# Patient Record
Sex: FEMALE | Race: WHITE | Employment: STUDENT | URBAN - METROPOLITAN AREA
[De-identification: names, ages, dates, MRNs, and addresses within clinical notes are randomized per-mention and may not be internally consistent; named-entity substitution may affect disease eponyms.]

---
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
## 2023-01-02 ENCOUNTER — E-VISIT (OUTPATIENT)
Dept: PRIMARY CARE CLINIC | Age: 18
End: 2023-01-02
Payer: COMMERCIAL

## 2023-01-02 DIAGNOSIS — N76.0 ACUTE VAGINITIS: Primary | ICD-10-CM

## 2023-01-02 PROCEDURE — 99422 OL DIG E/M SVC 11-20 MIN: CPT | Performed by: NURSE PRACTITIONER

## 2023-01-02 RX ORDER — FLUCONAZOLE 150 MG/1
150 TABLET ORAL ONCE
Qty: 1 TABLET | Refills: 1 | Status: SHIPPED | OUTPATIENT
Start: 2023-01-02 | End: 2023-01-02

## 2023-01-02 NOTE — PROGRESS NOTES
Abdulaziz Aguilar (2005) initiated an asynchronous digital communication through [employer]. HPI: per patient questionnaire     Exam: not applicable    Diagnoses and all orders for this visit:  Diagnoses and all orders for this visit:    Acute vaginitis    Other orders  -     fluconazole (DIFLUCAN) 150 MG tablet; Take 1 tablet by mouth once for 1 dose May repeat in 3 days if needed  Supportive care measures provided  Recommend f/u with pcp or gyn if needed     Time: EV2 - 11-20 minutes were spent on the digital evaluation and management of this patient.  14 min     Gerhardt Plowman, APRN - CNP

## 2023-01-09 ENCOUNTER — TELEPHONE (OUTPATIENT)
Dept: FAMILY MEDICINE CLINIC | Age: 18
End: 2023-01-09

## 2023-01-09 NOTE — TELEPHONE ENCOUNTER
----- Message from Johnnie Fox sent at 1/9/2023  1:57 PM EST -----  Regarding: Flu   Hello I have the flu and I am unable to go to work due to frequent vomiting and diarrhea. This started yesterday morning 01/08/2023. I feel a little better today but am unsure when Ill be able to return to work. Can you please write a doctors note stating I have the flu? Thank you.

## 2023-02-27 ENCOUNTER — OFFICE VISIT (OUTPATIENT)
Dept: FAMILY MEDICINE CLINIC | Age: 18
End: 2023-02-27
Payer: COMMERCIAL

## 2023-02-27 VITALS
SYSTOLIC BLOOD PRESSURE: 102 MMHG | DIASTOLIC BLOOD PRESSURE: 68 MMHG | HEART RATE: 91 BPM | WEIGHT: 184 LBS | BODY MASS INDEX: 30.66 KG/M2 | OXYGEN SATURATION: 94 % | TEMPERATURE: 97.6 F | HEIGHT: 65 IN

## 2023-02-27 DIAGNOSIS — J30.9 ALLERGIC RHINITIS, UNSPECIFIED SEASONALITY, UNSPECIFIED TRIGGER: ICD-10-CM

## 2023-02-27 DIAGNOSIS — R06.83 SNORING: ICD-10-CM

## 2023-02-27 DIAGNOSIS — J01.20 ACUTE NON-RECURRENT ETHMOIDAL SINUSITIS: ICD-10-CM

## 2023-02-27 DIAGNOSIS — G47.9 SLEEP DISTURBANCE: ICD-10-CM

## 2023-02-27 DIAGNOSIS — F41.9 ANXIETY: Primary | ICD-10-CM

## 2023-02-27 PROCEDURE — 99214 OFFICE O/P EST MOD 30 MIN: CPT | Performed by: FAMILY MEDICINE

## 2023-02-27 PROCEDURE — 1036F TOBACCO NON-USER: CPT | Performed by: FAMILY MEDICINE

## 2023-02-27 PROCEDURE — G8484 FLU IMMUNIZE NO ADMIN: HCPCS | Performed by: FAMILY MEDICINE

## 2023-02-27 PROCEDURE — G8419 CALC BMI OUT NRM PARAM NOF/U: HCPCS | Performed by: FAMILY MEDICINE

## 2023-02-27 PROCEDURE — G8427 DOCREV CUR MEDS BY ELIG CLIN: HCPCS | Performed by: FAMILY MEDICINE

## 2023-02-27 SDOH — ECONOMIC STABILITY: HOUSING INSECURITY
IN THE LAST 12 MONTHS, WAS THERE A TIME WHEN YOU DID NOT HAVE A STEADY PLACE TO SLEEP OR SLEPT IN A SHELTER (INCLUDING NOW)?: NO

## 2023-02-27 SDOH — ECONOMIC STABILITY: FOOD INSECURITY: WITHIN THE PAST 12 MONTHS, THE FOOD YOU BOUGHT JUST DIDN'T LAST AND YOU DIDN'T HAVE MONEY TO GET MORE.: NEVER TRUE

## 2023-02-27 SDOH — ECONOMIC STABILITY: FOOD INSECURITY: WITHIN THE PAST 12 MONTHS, YOU WORRIED THAT YOUR FOOD WOULD RUN OUT BEFORE YOU GOT MONEY TO BUY MORE.: NEVER TRUE

## 2023-02-27 SDOH — ECONOMIC STABILITY: INCOME INSECURITY: HOW HARD IS IT FOR YOU TO PAY FOR THE VERY BASICS LIKE FOOD, HOUSING, MEDICAL CARE, AND HEATING?: NOT HARD AT ALL

## 2023-02-27 ASSESSMENT — PATIENT HEALTH QUESTIONNAIRE - PHQ9
8. MOVING OR SPEAKING SO SLOWLY THAT OTHER PEOPLE COULD HAVE NOTICED. OR THE OPPOSITE, BEING SO FIGETY OR RESTLESS THAT YOU HAVE BEEN MOVING AROUND A LOT MORE THAN USUAL: 1
7. TROUBLE CONCENTRATING ON THINGS, SUCH AS READING THE NEWSPAPER OR WATCHING TELEVISION: 1
6. FEELING BAD ABOUT YOURSELF - OR THAT YOU ARE A FAILURE OR HAVE LET YOURSELF OR YOUR FAMILY DOWN: 1
9. THOUGHTS THAT YOU WOULD BE BETTER OFF DEAD, OR OF HURTING YOURSELF: 0
2. FEELING DOWN, DEPRESSED OR HOPELESS: 1
SUM OF ALL RESPONSES TO PHQ QUESTIONS 1-9: 8
3. TROUBLE FALLING OR STAYING ASLEEP: 1
4. FEELING TIRED OR HAVING LITTLE ENERGY: 1
5. POOR APPETITE OR OVEREATING: 1
SUM OF ALL RESPONSES TO PHQ QUESTIONS 1-9: 8
10. IF YOU CHECKED OFF ANY PROBLEMS, HOW DIFFICULT HAVE THESE PROBLEMS MADE IT FOR YOU TO DO YOUR WORK, TAKE CARE OF THINGS AT HOME, OR GET ALONG WITH OTHER PEOPLE: 0
SUM OF ALL RESPONSES TO PHQ9 QUESTIONS 1 & 2: 2
1. LITTLE INTEREST OR PLEASURE IN DOING THINGS: 1
SUM OF ALL RESPONSES TO PHQ QUESTIONS 1-9: 8
SUM OF ALL RESPONSES TO PHQ QUESTIONS 1-9: 8

## 2023-02-27 ASSESSMENT — COLUMBIA-SUICIDE SEVERITY RATING SCALE - C-SSRS
1. WITHIN THE PAST MONTH, HAVE YOU WISHED YOU WERE DEAD OR WISHED YOU COULD GO TO SLEEP AND NOT WAKE UP?: NO
6. HAVE YOU EVER DONE ANYTHING, STARTED TO DO ANYTHING, OR PREPARED TO DO ANYTHING TO END YOUR LIFE?: NO
2. HAVE YOU ACTUALLY HAD ANY THOUGHTS OF KILLING YOURSELF?: NO

## 2023-02-27 NOTE — PROGRESS NOTES
Chief Complaint   Patient presents with    Discuss Medications     Discuss prozac & see if it effects sleeps    Sinus Problem     Pt has reoccurring sinus infections & wants to discuss. HPI: Noah Zhou 19 y.o. female presenting for      having sinus issues   Reports that her allergies are very bad   reports that she is doing well today but there are times where she has congestion and postnasal drip   Reports that she feels more sick frequently and reports that it is affecting her more. Patient reports that she can hear wheezing in the lungs that make it worse. Has tried Claritin, benadryl, does not help   allegra sometimes helps, zyrtec and flonase helps a little bit. F/u   Patient reprots that for a while she has allergies. Symptoms have worsened   Also noticed she gets a lot of sinus infections   Was seen by ENT who suggested she sees an immunologist.   Denies any current sinus infection. Insomnia   Patient reports issues with sleep   Reports that she is having stressors   Patient is sleeping more but is unable to fall asleep   Admits to tossing and turning throughout the night. Has not had a sleep study before. Patient is coming here with a chief complaint of depression. Patient reports that she was referred by her pediatrician to establish care with a family practice physician. Patient sees a counselor and has been establishing care for the last 6 years. Patient has a diagnosis of depression and anxiety. Patient reports that she has a difficult time showering, getting out of bed, changes in her appetite, suicidal ideations (last suicidal ideation was several weeks ago). Denies any plans. Patient has never been admitted to the psychiatric unit. Patient has history of self cutting with last 1 last year. Patient admits now that she tends to punch walls and hits her thighs bilaterally to relieve stress. Patient used to cut on her thighs and on her left arm.   Patient admits to having good social support system with her boyfriend as well as her father. Patient denies any fevers, chills, nausea, vomiting, chest pain, shortness of breath, abdominal pain, change in nation, change in stools. Patient is currently taking high school courses in college online courses at [employer]. Patient has plans to become a medical doctor. Patient reports that balancing her school courses in addition to handling her depression anxiety has been difficult and has affected her grades. Patient reports that she is doing better in school now. She denies any current homicidal or suicidal ideations. Follow-up 9/14/2020  Patient is following up for her depression. Patient reports after taking Lexapro for the last week and a half she noticed some side effects. Patient states frequency and suicidal ideations. Denies any intent or plan. Patient denies any homicidal ideations. Patient denies auditory or visual hallucinations. Patient still admits to a good social support system with her counselor and her boyfriend. After having  increased suicidal ideation, patient stopped taking the medication several days ago. Denies fever, chills, nausea, vomiting, chest pain, shortness of breath, abdominal pain, change urination, change in stools. Follow-up 10/23/2020  Patient reports that she had some side effects from the Zoloft medication. As you may recall, patient was on 12.5 mg of the Zoloft. Patient ports that she felt like the medication was working however she did have some sedative effects with taking the medicine. Patient tried to take the medication at night however found herself sleeping past her alarm clock and only finding the medication wearing off around 12 PM.  Patient took the medication around 10 p.m. Patient denies any suicidal homicidal ideations. Patient denies any fevers, chills, nausea, vomiting, chest pain, shortness of breath, abdominal pain, change urination, change in stools.   Patient reports that she still sees her counselor. f/u 12/3/2020  Patient reports that the medication seems to have great effects within the first week but then started to notice it wearing off. Patient admitted to having side effects of having low moods that were fluctuated but not as much she was on the other medication. Patient denies any suicidal homicidal ideations currently. Denies any auditory visual hallucinations. Patient still seeing her counselor. F/u   Seeing counseling. In the last couple increased panic attacks (shortness of breath, heart racing)  Feels like friends are a trigger  Waiting to try prozac middle of April.  - will schdule an appoitemtn 1 month after that. Current Outpatient Medications   Medication Sig Dispense Refill    albuterol sulfate HFA (PROAIR HFA) 108 (90 Base) MCG/ACT inhaler Use 1-2 puffs, every 4 hours while awake for PRN wheezing. May sub Ventolin or Proventil as needed per [employer]. 18 g 2    fluticasone (FLONASE) 50 MCG/ACT nasal spray 2 sprays by Each Nostril route daily 16 g 0    EPINEPHrine (EPIPEN) 0.3 MG/0.3ML SOAJ injection Inject 0.3 mg into the muscle      FLUoxetine (PROZAC) 10 MG capsule Take 1 capsule by mouth daily (Patient not taking: Reported on 2/27/2023) 30 capsule 3     No current facility-administered medications for this visit. ROS  CONSTITUTIONAL: The patient denies fevers, chills, sweats and body ache. HEENT: Denies headache, blurry vision, eye pain, tinnitus, vertigo,  sore throat, neck or thyroid masses. Admits to postauricular ear pain. RESPIRATORY: Denies cough, sputum, hemoptysis. CARDIAC: Denies chest pain, pressure, palpitations, Denies lower extremity edema. GASTROINTESTINAL: Denies abdominal pain, constipation, diarrhea, bleeding in the stools,   GENITOURINARY: Denies dysuria, hematuria, nocturia or frequency, urinary incontinence.   NEUROLOGIC: Denies headaches, dizziness, syncope, weakness  MUSCULOSKELETAL: denies changes in range of motion, joint pain, stiffness. ENDOCRINOLOGY: Denies heat or cold intolerance. HEMATOLOGY: Denies easy bleeding or blood transfusion,anemia  DERMATOLOGY: Denies changes in moles or pigmentation changes. PSYCHIATRY: Admits to depression and anxiety. Admits to insomnia     Past Medical History:   Diagnosis Date    Anxiety     Depression         No past surgical history on file. Family History   Problem Relation Age of Onset    Diabetes Mother     Depression Mother     Diabetes Father     Heart Disease Father     High Blood Pressure Father     High Cholesterol Father         Social History     Socioeconomic History    Marital status: Single     Spouse name: Not on file    Number of children: Not on file    Years of education: Not on file    Highest education level: Not on file   Occupational History    Not on file   Tobacco Use    Smoking status: Never    Smokeless tobacco: Never   Vaping Use    Vaping Use: Never used   Substance and Sexual Activity    Alcohol use: Yes     Comment: ocaasionally    Drug use: Yes     Types: Marijuana (Weed)     Comment: occasionally    Sexual activity: Yes     Birth control/protection: I.U.D. Other Topics Concern    Not on file   Social History Narrative    Patient lives with her father. Patient does not have a relationship with her mother (has a history of depression and substance use). Patient has plans to become a medical doctor and is currently taking high school courses and college courses at [employer]. The [employer] program     Social Determinants of Health     Financial Resource Strain: Low Risk     Difficulty of Paying Living Expenses: Not hard at all   Food Insecurity: No Food Insecurity    Worried About [address] in the Last Year: Never true    [address] in the Last Year: Never true   Transportation Needs: Unknown    Lack of Transportation (Medical): Not on file    Lack of Transportation (Non-Medical):  No   Physical Activity: Not on file   Stress: Not on file   Social Connections: Not on file   Intimate Partner Violence: Not on file   Housing Stability: Unknown    Unable to Pay for Housing in the Last Year: Not on file    Number of [place] in the Last Year: Not on file    Unstable Housing in the Last Year: No        /68   Pulse 91   Temp 97.6 °F (36.4 °C) (Temporal)   Ht 5' 5\" (1.651 m)   Wt 184 lb (83.5 kg)   SpO2 94%   BMI 30.62 kg/m²        Physical Exam:    General appearance - alert, well appearing, and in no distress  Mental Status - alert, oriented to person, place, and time  Eyes - pupils equal and reactive, extraocular eye movements intact   Ears - bilateral TM's and external ear canals normal. Small amount of swelling behind the ear. No redness or discharge.    Nose - normal and patent, no erythema, discharge or polyps   Sinuses - Normal paranasal sinuses without tenderness   Throat - mucous membranes moist, pharynx normal without lesions   Neck - supple, no significant adenopathy   Thyroid - thyroid is normal in size without nodules or tenderness    Chest - clear to auscultation, no wheezes, rales or rhonchi, symmetric air entry   Heart - normal rate, regular rhythm, normal S1, S2, no murmurs, rubs, clicks or gallops  Abdomen - soft, nontender, nondistended, no masses or organomegaly   Back exam - full range of motion, no tenderness, palpable spasm or pain on motion   Neurological - alert, oriented, normal speech, no focal findings or movement disorder noted   Musculoskeletal - no joint tenderness, deformity or swelling   Extremities - peripheral pulses normal, no pedal edema, no clubbing or cyanosis   Skin - normal coloration and turgor, no rashes, no suspicious skin lesions noted      Labs   No results found for: TSHREFLEX  No results found for: TSH    No results found for: NA, K, CL, CO2, BUN, CREATININE, GLUCOSE, CALCIUM, PROT, LABALBU, BILITOT, ALKPHOS, AST, ALT, LABGLOM, GFRAA, AGRATIO, GLOB      No results found for: WBC, HGB, HCT, MCV, PLT    Reviewed notes from pediatrics    A/P: Margo Layton 19 y.o. female presenting for     1. Anxiety  Patient wants to start Prozac when she begins spring break. Continue seeing her counselor. Denies any suicidal homicidal ideations. - CBC with Auto Differential; Future  - Comprehensive Metabolic Panel; Future    2. Allergic rhinitis, unspecified seasonality, unspecified trigger    - CBC with Auto Differential; Future  - Comprehensive Metabolic Panel; Future    3. Acute non-recurrent ethmoidal sinusitis  Recurring allergies. Patient will see a allergist.    4. Sleep disturbance  We will see if it is covered by her  - Baseline Diagnostic Sleep Study; Future    5. Snoring    - Baseline Diagnostic Sleep Study; Future          Please note, this report has been partially produced using speech recognition software  and may cause  and /or contain errors related to that system including grammar, punctuation and spelling as well as words and phrases that may seem inappropriate. If there are questions or concerns please feel free to contact me to clarify.

## 2023-03-01 ENCOUNTER — E-VISIT (OUTPATIENT)
Dept: PRIMARY CARE CLINIC | Age: 18
End: 2023-03-01
Payer: COMMERCIAL

## 2023-03-01 DIAGNOSIS — R30.0 DYSURIA: Primary | ICD-10-CM

## 2023-03-01 PROCEDURE — 99422 OL DIG E/M SVC 11-20 MIN: CPT | Performed by: NURSE PRACTITIONER

## 2023-03-01 RX ORDER — CEPHALEXIN 500 MG/1
500 CAPSULE ORAL 2 TIMES DAILY
Qty: 14 CAPSULE | Refills: 0 | Status: SHIPPED | OUTPATIENT
Start: 2023-03-01 | End: 2023-03-08

## 2023-03-26 ENCOUNTER — OFFICE VISIT (OUTPATIENT)
Dept: FAMILY MEDICINE CLINIC | Age: 18
End: 2023-03-26
Payer: COMMERCIAL

## 2023-03-26 VITALS
RESPIRATION RATE: 15 BRPM | HEART RATE: 105 BPM | DIASTOLIC BLOOD PRESSURE: 72 MMHG | BODY MASS INDEX: 30.66 KG/M2 | TEMPERATURE: 97.3 F | OXYGEN SATURATION: 96 % | SYSTOLIC BLOOD PRESSURE: 106 MMHG | HEIGHT: 65 IN | WEIGHT: 184 LBS

## 2023-03-26 DIAGNOSIS — B37.9 ANTIBIOTIC-INDUCED YEAST INFECTION: ICD-10-CM

## 2023-03-26 DIAGNOSIS — J45.21 MILD INTERMITTENT REACTIVE AIRWAY DISEASE WITH ACUTE EXACERBATION: Primary | ICD-10-CM

## 2023-03-26 DIAGNOSIS — T36.95XA ANTIBIOTIC-INDUCED YEAST INFECTION: ICD-10-CM

## 2023-03-26 LAB
INFLUENZA A ANTIBODY: NEGATIVE
INFLUENZA B ANTIBODY: NEGATIVE
Lab: NORMAL
PERFORMING INSTRUMENT: NORMAL
QC PASS/FAIL: NORMAL
RSV ANTIGEN: NEGATIVE
SARS-COV-2, POC: NORMAL

## 2023-03-26 PROCEDURE — 87426 SARSCOV CORONAVIRUS AG IA: CPT | Performed by: NURSE PRACTITIONER

## 2023-03-26 PROCEDURE — G8427 DOCREV CUR MEDS BY ELIG CLIN: HCPCS | Performed by: NURSE PRACTITIONER

## 2023-03-26 PROCEDURE — 86756 RESPIRATORY VIRUS ANTIBODY: CPT | Performed by: NURSE PRACTITIONER

## 2023-03-26 PROCEDURE — 99213 OFFICE O/P EST LOW 20 MIN: CPT | Performed by: NURSE PRACTITIONER

## 2023-03-26 PROCEDURE — G8417 CALC BMI ABV UP PARAM F/U: HCPCS | Performed by: NURSE PRACTITIONER

## 2023-03-26 PROCEDURE — 1036F TOBACCO NON-USER: CPT | Performed by: NURSE PRACTITIONER

## 2023-03-26 PROCEDURE — 87804 INFLUENZA ASSAY W/OPTIC: CPT | Performed by: NURSE PRACTITIONER

## 2023-03-26 PROCEDURE — G8484 FLU IMMUNIZE NO ADMIN: HCPCS | Performed by: NURSE PRACTITIONER

## 2023-03-26 RX ORDER — AMOXICILLIN AND CLAVULANATE POTASSIUM 875; 125 MG/1; MG/1
1 TABLET, FILM COATED ORAL 2 TIMES DAILY
Qty: 20 TABLET | Refills: 0 | Status: SHIPPED | OUTPATIENT
Start: 2023-03-26 | End: 2023-04-05

## 2023-03-26 RX ORDER — PREDNISONE 50 MG/1
50 TABLET ORAL DAILY
Qty: 5 TABLET | Refills: 0 | Status: SHIPPED | OUTPATIENT
Start: 2023-03-26 | End: 2023-03-31

## 2023-03-26 RX ORDER — FLUCONAZOLE 150 MG/1
150 TABLET ORAL
Qty: 3 TABLET | Refills: 0 | Status: SHIPPED | OUTPATIENT
Start: 2023-03-26 | End: 2023-04-02

## 2023-03-26 ASSESSMENT — ENCOUNTER SYMPTOMS
ABDOMINAL PAIN: 0
EYE DISCHARGE: 0
SINUS PAIN: 1
CHEST TIGHTNESS: 1
SHORTNESS OF BREATH: 1
VOMITING: 0
EYE PAIN: 0
CONSTIPATION: 0
WHEEZING: 1
COUGH: 1
RHINORRHEA: 1
PHOTOPHOBIA: 0
EYE REDNESS: 0
SORE THROAT: 0
EYE ITCHING: 0
NAUSEA: 0
SINUS PRESSURE: 1
DIARRHEA: 0
TROUBLE SWALLOWING: 0

## 2023-03-26 ASSESSMENT — VISUAL ACUITY: OU: 1

## 2023-03-26 NOTE — PROGRESS NOTES
POCT Influenza A/B  -     POCT COVID-19, Antigen  -     POCT RSV  -     amoxicillin-clavulanate (AUGMENTIN) 875-125 MG per tablet; Take 1 tablet by mouth 2 times daily for 10 days  -     predniSONE (DELTASONE) 50 MG tablet; Take 1 tablet by mouth daily for 5 days    Antibiotic-induced yeast infection  -     fluconazole (DIFLUCAN) 150 MG tablet; Take 1 tablet by mouth every 72 hours for 3 doses    Side effects, adverse effects of the medication prescribed today, as well as treatment plan/ rationale and result expectations have been discussed with the patient who expresses understanding and desires to proceed. Close follow up to evaluate treatment results and for coordination of care. I have reviewed the patient's medical history in detail and updated the computerized patient record. As always, patient is advised that if symptoms worsen in any way they will proceed to the nearest emergency room.      Follow up in 48-72 hours if symptoms persist or worsen and as needed    Rob Dent, JAYDEN - CNP

## 2023-05-02 ENCOUNTER — OFFICE VISIT (OUTPATIENT)
Dept: FAMILY MEDICINE CLINIC | Age: 18
End: 2023-05-02
Payer: COMMERCIAL

## 2023-05-02 VITALS
WEIGHT: 187 LBS | TEMPERATURE: 97.4 F | DIASTOLIC BLOOD PRESSURE: 78 MMHG | BODY MASS INDEX: 31.16 KG/M2 | HEIGHT: 65 IN | HEART RATE: 72 BPM | SYSTOLIC BLOOD PRESSURE: 128 MMHG | OXYGEN SATURATION: 98 %

## 2023-05-02 DIAGNOSIS — K21.9 GASTROESOPHAGEAL REFLUX DISEASE WITHOUT ESOPHAGITIS: ICD-10-CM

## 2023-05-02 DIAGNOSIS — F41.9 ANXIETY: ICD-10-CM

## 2023-05-02 DIAGNOSIS — R42 VERTIGO: Primary | ICD-10-CM

## 2023-05-02 PROCEDURE — G8427 DOCREV CUR MEDS BY ELIG CLIN: HCPCS | Performed by: FAMILY MEDICINE

## 2023-05-02 PROCEDURE — 99214 OFFICE O/P EST MOD 30 MIN: CPT | Performed by: FAMILY MEDICINE

## 2023-05-02 PROCEDURE — 1036F TOBACCO NON-USER: CPT | Performed by: FAMILY MEDICINE

## 2023-05-02 PROCEDURE — G8417 CALC BMI ABV UP PARAM F/U: HCPCS | Performed by: FAMILY MEDICINE

## 2023-05-02 RX ORDER — FLUOXETINE 10 MG/1
10 CAPSULE ORAL DAILY
Qty: 30 CAPSULE | Refills: 1 | Status: SHIPPED | OUTPATIENT
Start: 2023-05-02

## 2023-05-02 RX ORDER — MECLIZINE HYDROCHLORIDE 25 MG/1
25 TABLET ORAL 3 TIMES DAILY PRN
Qty: 30 TABLET | Refills: 0 | Status: SHIPPED | OUTPATIENT
Start: 2023-05-02 | End: 2023-05-12

## 2023-05-02 NOTE — PROGRESS NOTES
Chief Complaint   Patient presents with    Medication Refill     Discuss prozac. Dizziness     Pt reports she is having nausea, dizziness, and stomach issues. HPI: Noah Zhou 19 y.o. female presenting for      having sinus issues   Reports that her allergies are very bad   reports that she is doing well today but there are times where she has congestion and postnasal drip   Reports that she feels more sick frequently and reports that it is affecting her more. Patient reports that she can hear wheezing in the lungs that make it worse. Has tried Claritin, benadryl, does not help   allegra sometimes helps, zyrtec and flonase helps a little bit. F/u   Patient reprots that for a while she has allergies. Symptoms have worsened   Also noticed she gets a lot of sinus infections   Was seen by ENT who suggested she sees an immunologist.   Denies any current sinus infection. Insomnia   Patient reports issues with sleep   Reports that she is having stressors   Patient is sleeping more but is unable to fall asleep   Admits to tossing and turning throughout the night. Has not had a sleep study before. Patient is coming here with a chief complaint of depression. Patient reports that she was referred by her pediatrician to establish care with a family practice physician. Patient sees a counselor and has been establishing care for the last 6 years. Patient has a diagnosis of depression and anxiety. Patient reports that she has a difficult time showering, getting out of bed, changes in her appetite, suicidal ideations (last suicidal ideation was several weeks ago). Denies any plans. Patient has never been admitted to the psychiatric unit. Patient has history of self cutting with last 1 last year. Patient admits now that she tends to punch walls and hits her thighs bilaterally to relieve stress. Patient used to cut on her thighs and on her left arm.   Patient admits to having good social

## 2023-11-06 ENCOUNTER — TELEPHONE (OUTPATIENT)
Dept: FAMILY MEDICINE CLINIC | Age: 18
End: 2023-11-06

## 2023-11-06 NOTE — TELEPHONE ENCOUNTER
Called patient per message below, left VM to call office to schedule.                 Appointment Request From: Minna Khan     With Provider: MD GARETT Diaz AT [facility]]     Preferred Date Range: 12/7/2023 - 12/14/2023     Preferred Times: Any Time     Reason for visit: Office Visit     Comments:  Weight loss, general health check up, mental health progress

## 2023-12-27 ENCOUNTER — E-VISIT (OUTPATIENT)
Dept: PRIMARY CARE CLINIC | Age: 18
End: 2023-12-27
Payer: COMMERCIAL

## 2023-12-27 DIAGNOSIS — J06.9 UPPER RESPIRATORY TRACT INFECTION, UNSPECIFIED TYPE: Primary | ICD-10-CM

## 2023-12-27 PROCEDURE — 99423 OL DIG E/M SVC 21+ MIN: CPT | Performed by: NURSE PRACTITIONER

## 2023-12-28 RX ORDER — METHYLPREDNISOLONE 4 MG/1
TABLET ORAL
Qty: 1 KIT | Refills: 0 | Status: SHIPPED | OUTPATIENT
Start: 2023-12-28 | End: 2023-12-29

## 2023-12-28 ASSESSMENT — LIFESTYLE VARIABLES
SMOKING_YEARS: 2
SMOKING_STATUS: YES

## 2023-12-28 NOTE — PROGRESS NOTES
Karen Abbott (2005) initiated an asynchronous digital communication through [employer]. HPI: per patient questionnaire     Exam: not applicable    Diagnoses and all orders for this visit:  Diagnoses and all orders for this visit:    Upper respiratory tract infection, unspecified type    Other orders  -     methylPREDNISolone (MEDROL, JACKSON,) 4 MG tablet; Take as directed    Sx started 3 days ago. Like allergy or viral. Steroids sent. Supportive care suggestions provided. F/u with pcp as needed    Time: EV3 - 21 or more minutes were spent on the digital evaluation and management of this patient. 23 min     Dorothy Arceo, APRN - CNP

## 2023-12-29 ENCOUNTER — E-VISIT (OUTPATIENT)
Dept: FAMILY MEDICINE CLINIC | Age: 18
End: 2023-12-29
Payer: COMMERCIAL

## 2023-12-29 DIAGNOSIS — B96.89 ACUTE BACTERIAL SINUSITIS: Primary | ICD-10-CM

## 2023-12-29 DIAGNOSIS — J01.90 ACUTE BACTERIAL SINUSITIS: Primary | ICD-10-CM

## 2023-12-29 DIAGNOSIS — U07.1 COVID-19: ICD-10-CM

## 2023-12-29 DIAGNOSIS — U07.1 COVID-19: Primary | ICD-10-CM

## 2023-12-29 PROCEDURE — 99421 OL DIG E/M SVC 5-10 MIN: CPT | Performed by: FAMILY MEDICINE

## 2023-12-29 RX ORDER — AMOXICILLIN AND CLAVULANATE POTASSIUM 875; 125 MG/1; MG/1
1 TABLET, FILM COATED ORAL 2 TIMES DAILY
Qty: 20 TABLET | Refills: 0 | Status: SHIPPED | OUTPATIENT
Start: 2023-12-29 | End: 2023-12-29

## 2023-12-29 RX ORDER — METHYLPREDNISOLONE 4 MG/1
TABLET ORAL
Qty: 1 KIT | Refills: 0 | Status: SHIPPED | OUTPATIENT
Start: 2023-12-29 | End: 2024-01-04

## 2023-12-29 ASSESSMENT — LIFESTYLE VARIABLES
SMOKING_STATUS: YES
SMOKING_YEARS: 2

## 2023-12-29 NOTE — PROGRESS NOTES
Patient is complaining of sinus infection. Going on for 4 days. Associated with thick nasal discarge and fevers. Patient is also complaining of coughing. In the past patient's symptoms resolved with abx and steroids.        Sinusitis   Spent 5 minutes on encounter   Treat with augmentin x 10 days and steroids   Recommend covid test

## 2024-01-04 ENCOUNTER — TELEPHONE (OUTPATIENT)
Dept: FAMILY MEDICINE CLINIC | Age: 19
End: 2024-01-04

## 2024-01-04 NOTE — TELEPHONE ENCOUNTER
----- Message from Janeth Dani sent at 1/4/2024 11:07 AM EST -----  Subject: Message to Provider    QUESTIONS  Information for Provider? Patient called to set appt. Please call patient   to appoint as per her message. Thank you  ---------------------------------------------------------------------------  --------------  CALL BACK INFO  [phone]; OK to leave message on voicemail  ---------------------------------------------------------------------------  --------------  SCRIPT ANSWERS  Relationship to Patient? Self

## 2024-01-10 ENCOUNTER — OFFICE VISIT (OUTPATIENT)
Dept: FAMILY MEDICINE CLINIC | Age: 19
End: 2024-01-10
Payer: COMMERCIAL

## 2024-01-10 VITALS
WEIGHT: 214 LBS | DIASTOLIC BLOOD PRESSURE: 70 MMHG | HEART RATE: 112 BPM | HEIGHT: 65 IN | BODY MASS INDEX: 35.65 KG/M2 | TEMPERATURE: 97.8 F | SYSTOLIC BLOOD PRESSURE: 104 MMHG | OXYGEN SATURATION: 98 %

## 2024-01-10 DIAGNOSIS — N63.23 MASS OF LOWER OUTER QUADRANT OF LEFT BREAST: Primary | ICD-10-CM

## 2024-01-10 DIAGNOSIS — J06.9 RECURRENT UPPER RESPIRATORY INFECTION (URI): ICD-10-CM

## 2024-01-10 PROCEDURE — 99213 OFFICE O/P EST LOW 20 MIN: CPT | Performed by: FAMILY MEDICINE

## 2024-01-10 ASSESSMENT — PATIENT HEALTH QUESTIONNAIRE - PHQ9
SUM OF ALL RESPONSES TO PHQ QUESTIONS 1-9: 2
SUM OF ALL RESPONSES TO PHQ QUESTIONS 1-9: 2
2. FEELING DOWN, DEPRESSED OR HOPELESS: 1
SUM OF ALL RESPONSES TO PHQ9 QUESTIONS 1 & 2: 2
2. FEELING DOWN, DEPRESSED OR HOPELESS: SEVERAL DAYS
SUM OF ALL RESPONSES TO PHQ9 QUESTIONS 1 & 2: 2
1. LITTLE INTEREST OR PLEASURE IN DOING THINGS: 1
SUM OF ALL RESPONSES TO PHQ QUESTIONS 1-9: 2
SUM OF ALL RESPONSES TO PHQ QUESTIONS 1-9: 2
1. LITTLE INTEREST OR PLEASURE IN DOING THINGS: SEVERAL DAYS

## 2024-01-10 NOTE — PROGRESS NOTES
Subjective  Marta Waddell 2005 is a 19 y.o. female who presents today with:  Chief Complaint   Patient presents with    Breast Mass     Marta reports that she believes that she found a lump in her left breast about a week ago. She does have some pain in the area with palpation. Denies discoloration of the skin or nipple discharge. Notes she does have a maternal cousin who has had breast cancer.     Chest Pain     She had COVID the week of 12/26. She was prescribed Paxlovid for her symptoms which she has taken as directed. Reports she had a lot of chest pain during the illness that has since resolved. She does vape.        Chest Pain       I have reviewed HPI and agree with above.  She has a very strong family history of many different types of cancer.  She has an aunt with breast cancer x 2.  The types of cancer range from melanoma, ovary, uterine, breast, lung and colon.    Review of Systems   Cardiovascular:  Positive for chest pain.   All other systems reviewed and are negative.      Past Medical History:   Diagnosis Date    Anxiety     COVID 12/29/2023    Depression      History reviewed. No pertinent surgical history.  Social History     Socioeconomic History    Marital status: Single     Spouse name: Not on file    Number of children: Not on file    Years of education: Not on file    Highest education level: Not on file   Occupational History    Not on file   Tobacco Use    Smoking status: Never    Smokeless tobacco: Never   Vaping Use    Vaping Use: Every day   Substance and Sexual Activity    Alcohol use: Yes     Comment: ocaasionally    Drug use: Yes     Types: Marijuana (Weed)     Comment: occasionally    Sexual activity: Yes     Partners: Male     Birth control/protection: I.U.D.   Other Topics Concern    Not on file   Social History Narrative    Patient lives with her father.  Patient does not have a relationship with her mother (has a history of depression and substance use).  Patient has

## 2024-01-23 ENCOUNTER — E-VISIT (OUTPATIENT)
Dept: FAMILY MEDICINE CLINIC | Age: 19
End: 2024-01-23
Payer: COMMERCIAL

## 2024-01-23 DIAGNOSIS — K21.9 GASTROESOPHAGEAL REFLUX DISEASE WITHOUT ESOPHAGITIS: Primary | ICD-10-CM

## 2024-01-23 PROCEDURE — 99421 OL DIG E/M SVC 5-10 MIN: CPT | Performed by: FAMILY MEDICINE

## 2024-01-23 RX ORDER — OMEPRAZOLE 40 MG/1
40 CAPSULE, DELAYED RELEASE ORAL
Qty: 30 CAPSULE | Refills: 5 | Status: SHIPPED | OUTPATIENT
Start: 2024-01-23

## 2024-01-23 NOTE — PROGRESS NOTES
Patient is complaining of heart burn for several months. Going on for 20 minutes to 1 hour.  Associated with belching. Triggers include, smoking, caffeine, lying down and stress.       1. Gastroesophageal reflux disease without esophagitis  Will treat with omeprazole 40 mg daily. Will send to pharmacy   Spent 5-10 minutes no encounter   - omeprazole (PRILOSEC) 40 MG delayed release capsule; Take 1 capsule by mouth every morning (before breakfast)  Dispense: 30 capsule; Refill: 5

## 2024-03-20 ENCOUNTER — OFFICE VISIT (OUTPATIENT)
Dept: FAMILY MEDICINE CLINIC | Age: 19
End: 2024-03-20
Payer: COMMERCIAL

## 2024-03-20 VITALS
BODY MASS INDEX: 35.32 KG/M2 | HEIGHT: 65 IN | WEIGHT: 212 LBS | DIASTOLIC BLOOD PRESSURE: 80 MMHG | SYSTOLIC BLOOD PRESSURE: 106 MMHG | HEART RATE: 129 BPM | TEMPERATURE: 100.3 F | OXYGEN SATURATION: 99 %

## 2024-03-20 DIAGNOSIS — J10.1 INFLUENZA A: Primary | ICD-10-CM

## 2024-03-20 DIAGNOSIS — R05.1 ACUTE COUGH: ICD-10-CM

## 2024-03-20 LAB
INFLUENZA A ANTIBODY: ABNORMAL
INFLUENZA B ANTIBODY: ABNORMAL
Lab: NORMAL
PERFORMING INSTRUMENT: NORMAL
QC PASS/FAIL: NORMAL
SARS-COV-2, POC: NORMAL

## 2024-03-20 PROCEDURE — 87804 INFLUENZA ASSAY W/OPTIC: CPT | Performed by: NURSE PRACTITIONER

## 2024-03-20 PROCEDURE — 87426 SARSCOV CORONAVIRUS AG IA: CPT | Performed by: NURSE PRACTITIONER

## 2024-03-20 PROCEDURE — 99213 OFFICE O/P EST LOW 20 MIN: CPT | Performed by: NURSE PRACTITIONER

## 2024-03-20 RX ORDER — DEXTROMETHORPHAN HYDROBROMIDE AND PROMETHAZINE HYDROCHLORIDE 15; 6.25 MG/5ML; MG/5ML
5 SYRUP ORAL 4 TIMES DAILY PRN
Qty: 140 EACH | Refills: 0 | Status: SHIPPED | OUTPATIENT
Start: 2024-03-20 | End: 2024-03-27

## 2024-03-20 RX ORDER — OSELTAMIVIR PHOSPHATE 75 MG/1
75 CAPSULE ORAL 2 TIMES DAILY
Qty: 10 CAPSULE | Refills: 0 | Status: SHIPPED | OUTPATIENT
Start: 2024-03-20 | End: 2024-03-25

## 2024-03-20 SDOH — ECONOMIC STABILITY: FOOD INSECURITY: WITHIN THE PAST 12 MONTHS, THE FOOD YOU BOUGHT JUST DIDN'T LAST AND YOU DIDN'T HAVE MONEY TO GET MORE.: NEVER TRUE

## 2024-03-20 SDOH — ECONOMIC STABILITY: FOOD INSECURITY: WITHIN THE PAST 12 MONTHS, YOU WORRIED THAT YOUR FOOD WOULD RUN OUT BEFORE YOU GOT MONEY TO BUY MORE.: NEVER TRUE

## 2024-03-20 SDOH — ECONOMIC STABILITY: INCOME INSECURITY: HOW HARD IS IT FOR YOU TO PAY FOR THE VERY BASICS LIKE FOOD, HOUSING, MEDICAL CARE, AND HEATING?: NOT HARD AT ALL

## 2024-03-20 ASSESSMENT — ENCOUNTER SYMPTOMS
SINUS PAIN: 1
NAUSEA: 1
VOMITING: 0
ABDOMINAL PAIN: 0
SORE THROAT: 1
WHEEZING: 0
DIARRHEA: 1
RHINORRHEA: 1
SINUS PRESSURE: 1
SHORTNESS OF BREATH: 0
CHEST TIGHTNESS: 1
COUGH: 1

## 2024-03-20 NOTE — PROGRESS NOTES
Subjective:      Patient ID: Marta Waddell is a 19 y.o. female who presents today for:  Chief Complaint   Patient presents with    URI     Pt states coughing, sinus issues, headache, cold and hot sweats, nausea, dizziness x 3 days       HPI  Patient is here with c/o cough, sinus pressure HA, feeling hot and cold, dizziness, and nausea for the last 3 days.   Says she has an inhaler but she is not sure if she has asthma.   Says she has had fever but she has not been able to check it.   Says she is taking Cold and sinus med for sx as well.   Past Medical History:   Diagnosis Date    Anxiety     COVID 12/29/2023    Depression      History reviewed. No pertinent surgical history.  Social History     Socioeconomic History    Marital status: Single     Spouse name: Not on file    Number of children: Not on file    Years of education: Not on file    Highest education level: Not on file   Occupational History    Not on file   Tobacco Use    Smoking status: Never    Smokeless tobacco: Never   Vaping Use    Vaping Use: Every day   Substance and Sexual Activity    Alcohol use: Yes     Comment: ocaasionally    Drug use: Yes     Types: Marijuana (Weed)     Comment: occasionally    Sexual activity: Yes     Partners: Male     Birth control/protection: I.U.D.   Other Topics Concern    Not on file   Social History Narrative    Patient lives with her father.  Patient does not have a relationship with her mother (has a history of depression and substance use).  Patient has plans to become a medical doctor and is currently taking high school courses and college courses at [employer].  The [employer] program     Social Determinants of Health     Financial Resource Strain: Low Risk  (3/20/2024)    Overall Financial Resource Strain (CARDIA)     Difficulty of Paying Living Expenses: Not hard at all   Food Insecurity: No Food Insecurity (3/20/2024)    Hunger Vital Sign     Worried About Running Out of Food in the Last Year: Never true     Ran Out of

## 2024-11-26 ENCOUNTER — OFFICE VISIT (OUTPATIENT)
Dept: FAMILY MEDICINE CLINIC | Age: 19
End: 2024-11-26
Payer: COMMERCIAL

## 2024-11-26 VITALS
TEMPERATURE: 97.4 F | WEIGHT: 212 LBS | OXYGEN SATURATION: 98 % | DIASTOLIC BLOOD PRESSURE: 72 MMHG | HEIGHT: 65 IN | BODY MASS INDEX: 35.32 KG/M2 | SYSTOLIC BLOOD PRESSURE: 108 MMHG | HEART RATE: 81 BPM

## 2024-11-26 DIAGNOSIS — J02.9 SORE THROAT: ICD-10-CM

## 2024-11-26 DIAGNOSIS — B96.89 ACUTE BACTERIAL SINUSITIS: Primary | ICD-10-CM

## 2024-11-26 DIAGNOSIS — J01.90 ACUTE BACTERIAL SINUSITIS: Primary | ICD-10-CM

## 2024-11-26 LAB — S PYO AG THROAT QL: NORMAL

## 2024-11-26 PROCEDURE — 87880 STREP A ASSAY W/OPTIC: CPT | Performed by: NURSE PRACTITIONER

## 2024-11-26 PROCEDURE — 99213 OFFICE O/P EST LOW 20 MIN: CPT | Performed by: NURSE PRACTITIONER

## 2024-11-26 RX ORDER — AMOXICILLIN 875 MG/1
875 TABLET, COATED ORAL 2 TIMES DAILY
Qty: 20 TABLET | Refills: 0 | Status: SHIPPED | OUTPATIENT
Start: 2024-11-26 | End: 2024-12-06

## 2024-11-26 ASSESSMENT — ENCOUNTER SYMPTOMS
FACIAL SWELLING: 0
WHEEZING: 0
EYE PAIN: 0
SINUS PRESSURE: 1
STRIDOR: 0
COUGH: 0
ABDOMINAL PAIN: 0
EYE REDNESS: 0
EYE ITCHING: 0
SINUS PAIN: 1
DIARRHEA: 0
EYE DISCHARGE: 0
NAUSEA: 0
SORE THROAT: 1
SHORTNESS OF BREATH: 0
RHINORRHEA: 1
CHEST TIGHTNESS: 0
VOMITING: 0

## 2024-11-26 NOTE — PROGRESS NOTES
sounds, S1 normal and S2 normal.   Pulmonary:      Effort: Pulmonary effort is normal.      Breath sounds: Normal breath sounds and air entry.   Musculoskeletal:         General: Normal range of motion.      Cervical back: Normal range of motion and neck supple.   Lymphadenopathy:      Cervical: No cervical adenopathy.   Skin:     General: Skin is warm and dry.      Capillary Refill: Capillary refill takes less than 2 seconds.   Neurological:      General: No focal deficit present.      Mental Status: She is alert and oriented to person, place, and time. Mental status is at baseline.   Psychiatric:         Attention and Perception: Attention and perception normal.         Mood and Affect: Mood and affect normal.         Speech: Speech normal.         Behavior: Behavior normal. Behavior is cooperative.         Thought Content: Thought content normal.         Cognition and Memory: Cognition and memory normal.         Judgment: Judgment normal.         Assessment:       Diagnosis Orders   1. Acute bacterial sinusitis  amoxicillin (AMOXIL) 875 MG tablet      2. Sore throat  POCT rapid strep A        No results found for this visit on 11/26/24.   Plan:     Assessment & Plan   Marta was seen today for cold symptoms.    Diagnoses and all orders for this visit:    Acute bacterial sinusitis  -     amoxicillin (AMOXIL) 875 MG tablet; Take 1 tablet by mouth 2 times daily for 10 days    Sore throat  -     POCT rapid strep A    Rapid strep testing is neg in office.    Will treat for bacterial sinus infection. Advised to take ANTB with food and increase fluids. Discussed administration and SE of the ANTB as well as using probiotic or eating yogurt while on the ANTB.  Advised may use OTC decongestant and nasal steroid to help with symptoms as needed. Discussed with patient should start to see improvement within 3-4 days and should f/u with worsening sx or with no improvement.     Antibiotic Instructions:   Complete the full

## 2025-03-24 ENCOUNTER — OFFICE VISIT (OUTPATIENT)
Age: 20
End: 2025-03-24
Payer: COMMERCIAL

## 2025-03-24 VITALS
HEART RATE: 96 BPM | OXYGEN SATURATION: 98 % | SYSTOLIC BLOOD PRESSURE: 126 MMHG | TEMPERATURE: 97.9 F | BODY MASS INDEX: 37.49 KG/M2 | DIASTOLIC BLOOD PRESSURE: 76 MMHG | HEIGHT: 65 IN | WEIGHT: 225 LBS

## 2025-03-24 DIAGNOSIS — F41.9 ANXIETY: ICD-10-CM

## 2025-03-24 DIAGNOSIS — E66.812 CLASS 2 OBESITY DUE TO EXCESS CALORIES WITH BODY MASS INDEX (BMI) OF 37.0 TO 37.9 IN ADULT, UNSPECIFIED WHETHER SERIOUS COMORBIDITY PRESENT: ICD-10-CM

## 2025-03-24 DIAGNOSIS — E66.09 CLASS 2 OBESITY DUE TO EXCESS CALORIES WITH BODY MASS INDEX (BMI) OF 37.0 TO 37.9 IN ADULT, UNSPECIFIED WHETHER SERIOUS COMORBIDITY PRESENT: ICD-10-CM

## 2025-03-24 DIAGNOSIS — R07.9 RIGHT-SIDED CHEST PAIN: ICD-10-CM

## 2025-03-24 DIAGNOSIS — J34.89 NASAL LESION: Primary | ICD-10-CM

## 2025-03-24 DIAGNOSIS — Z32.02 PREGNANCY EXAMINATION OR TEST, NEGATIVE RESULT: ICD-10-CM

## 2025-03-24 DIAGNOSIS — K21.9 GASTROESOPHAGEAL REFLUX DISEASE WITHOUT ESOPHAGITIS: ICD-10-CM

## 2025-03-24 LAB
ALBUMIN SERPL-MCNC: 4.9 G/DL (ref 3.5–4.6)
ALP SERPL-CCNC: 61 U/L (ref 40–130)
ALT SERPL-CCNC: 42 U/L (ref 0–33)
ANION GAP SERPL CALCULATED.3IONS-SCNC: 13 MEQ/L (ref 9–15)
AST SERPL-CCNC: 32 U/L (ref 0–35)
BILIRUB SERPL-MCNC: 0.7 MG/DL (ref 0.2–0.7)
BUN SERPL-MCNC: 12 MG/DL (ref 6–20)
CALCIUM SERPL-MCNC: 9.9 MG/DL (ref 8.5–9.9)
CHLORIDE SERPL-SCNC: 103 MEQ/L (ref 95–107)
CHOLEST SERPL-MCNC: 185 MG/DL (ref 0–199)
CO2 SERPL-SCNC: 23 MEQ/L (ref 20–31)
CONTROL: NORMAL
CREAT SERPL-MCNC: 0.79 MG/DL (ref 0.5–0.9)
GLOBULIN SER CALC-MCNC: 3.2 G/DL (ref 2.3–3.5)
GLUCOSE SERPL-MCNC: 91 MG/DL (ref 70–99)
HDLC SERPL-MCNC: 42 MG/DL (ref 40–59)
LDL CHOLESTEROL: 124 MG/DL (ref 0–129)
POTASSIUM SERPL-SCNC: 3.9 MEQ/L (ref 3.4–4.9)
PREGNANCY TEST URINE, POC: NEGATIVE
PROT SERPL-MCNC: 8.1 G/DL (ref 6.3–8)
SODIUM SERPL-SCNC: 139 MEQ/L (ref 135–144)
TRIGLYCERIDE, FASTING: 93 MG/DL (ref 0–150)

## 2025-03-24 PROCEDURE — G8427 DOCREV CUR MEDS BY ELIG CLIN: HCPCS | Performed by: FAMILY MEDICINE

## 2025-03-24 PROCEDURE — 99214 OFFICE O/P EST MOD 30 MIN: CPT | Performed by: FAMILY MEDICINE

## 2025-03-24 PROCEDURE — 1036F TOBACCO NON-USER: CPT | Performed by: FAMILY MEDICINE

## 2025-03-24 PROCEDURE — 81025 URINE PREGNANCY TEST: CPT | Performed by: FAMILY MEDICINE

## 2025-03-24 PROCEDURE — G8417 CALC BMI ABV UP PARAM F/U: HCPCS | Performed by: FAMILY MEDICINE

## 2025-03-24 RX ORDER — PHENTERMINE HYDROCHLORIDE 37.5 MG/1
37.5 TABLET ORAL
Qty: 30 TABLET | Refills: 0 | Status: SHIPPED | OUTPATIENT
Start: 2025-03-24 | End: 2025-04-23

## 2025-03-24 RX ORDER — MUPIROCIN 20 MG/G
OINTMENT TOPICAL
Qty: 22 G | Refills: 0 | Status: SHIPPED | OUTPATIENT
Start: 2025-03-24 | End: 2025-03-31

## 2025-03-24 RX ORDER — OMEPRAZOLE 40 MG/1
40 CAPSULE, DELAYED RELEASE ORAL
Qty: 30 CAPSULE | Refills: 5 | Status: SHIPPED | OUTPATIENT
Start: 2025-03-24

## 2025-03-24 SDOH — ECONOMIC STABILITY: FOOD INSECURITY: WITHIN THE PAST 12 MONTHS, YOU WORRIED THAT YOUR FOOD WOULD RUN OUT BEFORE YOU GOT MONEY TO BUY MORE.: NEVER TRUE

## 2025-03-24 SDOH — ECONOMIC STABILITY: FOOD INSECURITY: WITHIN THE PAST 12 MONTHS, THE FOOD YOU BOUGHT JUST DIDN'T LAST AND YOU DIDN'T HAVE MONEY TO GET MORE.: NEVER TRUE

## 2025-03-24 ASSESSMENT — PATIENT HEALTH QUESTIONNAIRE - PHQ9
3. TROUBLE FALLING OR STAYING ASLEEP: SEVERAL DAYS
5. POOR APPETITE OR OVEREATING: MORE THAN HALF THE DAYS
6. FEELING BAD ABOUT YOURSELF - OR THAT YOU ARE A FAILURE OR HAVE LET YOURSELF OR YOUR FAMILY DOWN: NEARLY EVERY DAY
10. IF YOU CHECKED OFF ANY PROBLEMS, HOW DIFFICULT HAVE THESE PROBLEMS MADE IT FOR YOU TO DO YOUR WORK, TAKE CARE OF THINGS AT HOME, OR GET ALONG WITH OTHER PEOPLE: SOMEWHAT DIFFICULT
2. FEELING DOWN, DEPRESSED OR HOPELESS: NEARLY EVERY DAY
SUM OF ALL RESPONSES TO PHQ QUESTIONS 1-9: 11
1. LITTLE INTEREST OR PLEASURE IN DOING THINGS: NOT AT ALL
9. THOUGHTS THAT YOU WOULD BE BETTER OFF DEAD, OR OF HURTING YOURSELF: NOT AT ALL
4. FEELING TIRED OR HAVING LITTLE ENERGY: MORE THAN HALF THE DAYS
SUM OF ALL RESPONSES TO PHQ QUESTIONS 1-9: 11
SUM OF ALL RESPONSES TO PHQ QUESTIONS 1-9: 11
7. TROUBLE CONCENTRATING ON THINGS, SUCH AS READING THE NEWSPAPER OR WATCHING TELEVISION: NOT AT ALL
8. MOVING OR SPEAKING SO SLOWLY THAT OTHER PEOPLE COULD HAVE NOTICED. OR THE OPPOSITE, BEING SO FIGETY OR RESTLESS THAT YOU HAVE BEEN MOVING AROUND A LOT MORE THAN USUAL: NOT AT ALL
SUM OF ALL RESPONSES TO PHQ QUESTIONS 1-9: 11

## 2025-03-24 NOTE — PROGRESS NOTES
Chief Complaint   Patient presents with    Annual Exam    Skin Problem     On septum    Chest Pain     On right side, under breast.        HPI: Marta Waddell 20 y.o. female presenting for   History of Present Illness  The patient presents for evaluation of nasal lesion, chest pain, weight management, and indigestion.    She reports a recurrent growth of dead skin within her nasal cavity, which she attempts to remove manually. This condition, which began in her teenage years, is associated with significant discomfort. A previous consultation at a walk-in clinic suggested a diagnosis of MRSA, although no confirmatory swab test was performed.    Intermittent chest pain localized beneath the right breast has been occurring for the past 2 weeks. Initially, precordial catch syndrome was suspected due to a sensation of bone impingement, a condition previously experienced. However, the current pain is different as it is located laterally along the rib and does not resolve with deep breathing. Some relief has been found with Advil and ibuprofen, but the pain persists. Typically, 2 to 3 tablets of Advil (400 to 600 mg) are taken as needed. Currently, no pain is experienced.    Current weight is 225 pounds. Over the past few months, evening exercises and attempts to limit food intake have been made. There is a desire to lose weight and improve overall health, believing that weight may contribute to sleep disturbances and shortness of breath during prolonged physical activity such as running or walking. Open to consulting a nutritionist and trying medications, there is a history of sensitivity to certain medications and regular coffee consumption.    Severe indigestion continues, attributed to anxiety. Digestion worsens during periods of heightened anxiety. Management has been with Tums as needed. A previously prescribed medication was not taken due to anxiety about potential side effects. A preference for spicy foods is also

## 2025-03-25 ENCOUNTER — RESULTS FOLLOW-UP (OUTPATIENT)
Age: 20
End: 2025-03-25

## 2025-03-25 LAB
ESTIMATED AVERAGE GLUCOSE: 80 MG/DL
HBA1C MFR BLD: 4.4 % (ref 4–6)

## 2025-04-01 ENCOUNTER — E-VISIT (OUTPATIENT)
Dept: PRIMARY CARE CLINIC | Age: 20
End: 2025-04-01
Payer: COMMERCIAL

## 2025-04-01 DIAGNOSIS — J06.9 VIRAL UPPER RESPIRATORY TRACT INFECTION: Primary | ICD-10-CM

## 2025-04-01 PROCEDURE — 99422 OL DIG E/M SVC 11-20 MIN: CPT | Performed by: NURSE PRACTITIONER

## 2025-04-01 ASSESSMENT — LIFESTYLE VARIABLES: SMOKING_STATUS: NO, I'VE NEVER SMOKED

## 2025-04-01 NOTE — PROGRESS NOTES
Marta Waddell (2005) initiated an asynchronous digital communication through [employer].    HPI: per patient questionnaire     Exam: not applicable    Diagnoses and all orders for this visit:  Diagnoses and all orders for this visit:    Viral upper respiratory tract infection      Sx started 2 days ago. Likely viral. Recommend supportive care for 7-10 days before antibiotics.suggestions provided. Fu with pcp as needed    18 minutes were spent on the digital evaluation and management of this patient.    Afia Michaud, APRN - CNP

## 2025-04-22 ENCOUNTER — OFFICE VISIT (OUTPATIENT)
Age: 20
End: 2025-04-22
Payer: COMMERCIAL

## 2025-04-22 VITALS
TEMPERATURE: 97.8 F | HEIGHT: 65 IN | SYSTOLIC BLOOD PRESSURE: 116 MMHG | WEIGHT: 214 LBS | DIASTOLIC BLOOD PRESSURE: 82 MMHG | BODY MASS INDEX: 35.65 KG/M2 | HEART RATE: 92 BPM | OXYGEN SATURATION: 98 %

## 2025-04-22 DIAGNOSIS — E66.812 OBESITY, CLASS II, BMI 35-39.9: ICD-10-CM

## 2025-04-22 PROCEDURE — 1036F TOBACCO NON-USER: CPT | Performed by: FAMILY MEDICINE

## 2025-04-22 PROCEDURE — G8417 CALC BMI ABV UP PARAM F/U: HCPCS | Performed by: FAMILY MEDICINE

## 2025-04-22 PROCEDURE — G8427 DOCREV CUR MEDS BY ELIG CLIN: HCPCS | Performed by: FAMILY MEDICINE

## 2025-04-22 PROCEDURE — 99214 OFFICE O/P EST MOD 30 MIN: CPT | Performed by: FAMILY MEDICINE

## 2025-04-22 RX ORDER — TOPIRAMATE 50 MG/1
50 TABLET, FILM COATED ORAL EVERY EVENING
Qty: 30 TABLET | Refills: 5 | Status: SHIPPED | OUTPATIENT
Start: 2025-04-22

## 2025-04-22 RX ORDER — PHENTERMINE HYDROCHLORIDE 37.5 MG/1
37.5 TABLET ORAL
Qty: 30 TABLET | Refills: 2 | Status: SHIPPED | OUTPATIENT
Start: 2025-04-22 | End: 2025-07-21

## 2025-04-22 NOTE — PROGRESS NOTES
Chief Complaint   Patient presents with    1 Month Follow-Up     Patient reports she was taking adipex  but stopped for 6 days since she had a sinus infection. She is back on it but feels like the effect is not working. She has been trying to reach her dietician but they have not reached out to her. Wants to discuss adipex.        HPI: Marta Waddell 20 y.o. female presenting for   History of Present Illness  The patient presents for weight loss management.      Weight management   Current weight is 225 pounds. Over the past few months, evening exercises and attempts to limit food intake have been made. There is a desire to lose weight and improve overall health, believing that weight may contribute to sleep disturbances and shortness of breath during prolonged physical activity such as running or walking. Open to consulting a nutritionist and trying medications, there is a history of sensitivity to certain medications and regular coffee consumption.     F/u     She has achieved a weight loss of approximately 10 pounds, with her home scale indicating a current weight of 212 pounds, while the clinic scale shows 214 pounds. No recent food or fluid intake is reported. Severe stomach cramping occurred on two occasions last week, initially thought to be menstrual discomfort but later suspected to be due to hunger. These episodes typically occur at night, despite maintaining a daily caloric intake of at least 1000 calories within a 1500 to 1800-calorie deficit. She has been adhering to a calorie-deficit diet and has sought the assistance of a dietitian for additional guidance. Her therapist has noted obsessive-compulsive tendencies in her approach to achieving a low-calorie intake.     Diet soda and Pepsi, which contain caffeine, are consumed without any adverse effects. She recalls feeling extremely calm after the initial dose of Adipex, a sensation that lasted for 3 to 4 days, and is considering the possibility of

## 2025-05-27 ENCOUNTER — TELEMEDICINE ON DEMAND (OUTPATIENT)
Age: 20
End: 2025-05-27
Payer: COMMERCIAL

## 2025-05-27 DIAGNOSIS — J02.0 PHARYNGITIS DUE TO STREPTOCOCCUS SPECIES: Primary | ICD-10-CM

## 2025-05-27 DIAGNOSIS — Z20.818 EXPOSURE TO STREPTOCOCCAL PHARYNGITIS: ICD-10-CM

## 2025-05-27 PROCEDURE — G8427 DOCREV CUR MEDS BY ELIG CLIN: HCPCS | Performed by: NURSE PRACTITIONER

## 2025-05-27 PROCEDURE — 99213 OFFICE O/P EST LOW 20 MIN: CPT | Performed by: NURSE PRACTITIONER

## 2025-05-27 PROCEDURE — 1036F TOBACCO NON-USER: CPT | Performed by: NURSE PRACTITIONER

## 2025-05-27 PROCEDURE — G8417 CALC BMI ABV UP PARAM F/U: HCPCS | Performed by: NURSE PRACTITIONER

## 2025-05-27 RX ORDER — AMOXICILLIN 500 MG/1
500 CAPSULE ORAL 2 TIMES DAILY
Qty: 20 CAPSULE | Refills: 0 | Status: SHIPPED | OUTPATIENT
Start: 2025-05-27 | End: 2025-06-06

## 2025-05-27 ASSESSMENT — ENCOUNTER SYMPTOMS
CHEST TIGHTNESS: 0
COUGH: 0
VOMITING: 0
WHEEZING: 0
NAUSEA: 0
DIARRHEA: 0
SORE THROAT: 1
SHORTNESS OF BREATH: 0
TROUBLE SWALLOWING: 1

## 2025-05-27 NOTE — PATIENT INSTRUCTIONS
Notify office if you do not improve or have worsening of condition.  Drink plenty of fluids and rest.  Practice good hand hygiene.  May sleep with humidifier as needed.  Gargle with warm salt water 3-4 times a day to help with sore throat.  1 teaspoon of honey 3 times a day.  You can use ice, warm chicken noodle soup, soft foods, popsicles and cough drops to help soothe your throat.  May take Tylenol/Ibuprofen (alternate) as needed for fever/pain.  Do not eat or drink after anyone.   Do  not share utensils.  Change out toothbrush to a new one when feeling better to prevent reinfection.  Cover your mouth and nose when you cough or sneeze.  Follow up with PCP in 3-4 days if not improving or sooner if worsening.  Please refer to educational handout with [employer].

## 2025-05-27 NOTE — PROGRESS NOTES
[facility] Virtualist Encounter Note       Chief Complaint   Patient presents with    Sore Throat     X2 days, exposed to +strep. C/o sore throat, sweats/chills, body aches       HPI:    Marta Waddell (:  2005) has requested an audio/video evaluation for the following concern(s):    This is an established patient of Dr. Milner. This is the first time I am seeing the patient today. She requested this visit for strep throat symptoms and exposure to step while on vacation in [place]. Person who she was with tested last night after they got home at  and positive for Strep A. Patient today with extremely sore throat with white patches, fever 101.3, swollen ant cervical nodes L>R, hard to swallow, throat beefy red and tonsils 2+ swollen when I observed by phone. She has had this before. CENTOR score 4. + chills. She is achy and tired. No wheezing. No SOB. No N/V/D.    Review of Systems   Constitutional:  Positive for chills, fatigue and fever.   HENT:  Positive for congestion, sore throat and trouble swallowing.    Respiratory:  Negative for cough, chest tightness, shortness of breath and wheezing.    Cardiovascular:  Negative for chest pain.   Gastrointestinal:  Negative for diarrhea, nausea and vomiting.   Genitourinary: Negative.    Musculoskeletal: Negative.        Prior to Visit Medications    Medication Sig Taking? Authorizing Provider   amoxicillin (AMOXIL) 500 MG capsule Take 1 capsule by mouth 2 times daily for 10 days Yes Carli Asher, APRN - CNP   topiramate (TOPAMAX) 50 MG tablet Take 1 tablet by mouth every evening Yes Ryan Wang MD   phentermine (ADIPEX-P) 37.5 MG tablet Take 1 tablet by mouth every morning (before breakfast) for 90 days. Max Daily Amount: 37.5 mg Yes Ryan Wang MD   omeprazole (PRILOSEC) 40 MG delayed release capsule Take 1 capsule by mouth every morning (before breakfast) Yes Ryan Wang MD   albuterol sulfate HFA

## 2025-06-12 ENCOUNTER — TELEMEDICINE ON DEMAND (OUTPATIENT)
Age: 20
End: 2025-06-12
Payer: COMMERCIAL

## 2025-06-12 DIAGNOSIS — R39.9 UTI SYMPTOMS: Primary | ICD-10-CM

## 2025-06-12 DIAGNOSIS — R30.0 DYSURIA: ICD-10-CM

## 2025-06-12 PROCEDURE — G8417 CALC BMI ABV UP PARAM F/U: HCPCS | Performed by: NURSE PRACTITIONER

## 2025-06-12 PROCEDURE — 1036F TOBACCO NON-USER: CPT | Performed by: NURSE PRACTITIONER

## 2025-06-12 PROCEDURE — G8427 DOCREV CUR MEDS BY ELIG CLIN: HCPCS | Performed by: NURSE PRACTITIONER

## 2025-06-12 PROCEDURE — 99213 OFFICE O/P EST LOW 20 MIN: CPT | Performed by: NURSE PRACTITIONER

## 2025-06-12 RX ORDER — NITROFURANTOIN 25; 75 MG/1; MG/1
100 CAPSULE ORAL 2 TIMES DAILY
Qty: 10 CAPSULE | Refills: 0 | Status: SHIPPED | OUTPATIENT
Start: 2025-06-12 | End: 2025-06-17

## 2025-06-12 ASSESSMENT — ENCOUNTER SYMPTOMS: NAUSEA: 1

## 2025-06-12 NOTE — PROGRESS NOTES
present when appropriate.   The patient was located at Home: [id number] [facility] [address]   Provider was located at Home (Appt Dept State):[place]